# Patient Record
Sex: MALE | Race: WHITE | NOT HISPANIC OR LATINO | URBAN - METROPOLITAN AREA
[De-identification: names, ages, dates, MRNs, and addresses within clinical notes are randomized per-mention and may not be internally consistent; named-entity substitution may affect disease eponyms.]

---

## 2017-10-09 ENCOUNTER — EMERGENCY (EMERGENCY)
Facility: HOSPITAL | Age: 47
LOS: 1 days | Discharge: HOME | End: 2017-10-09
Attending: EMERGENCY MEDICINE
Payer: COMMERCIAL

## 2017-10-09 VITALS
DIASTOLIC BLOOD PRESSURE: 81 MMHG | TEMPERATURE: 98 F | HEART RATE: 64 BPM | RESPIRATION RATE: 18 BRPM | SYSTOLIC BLOOD PRESSURE: 135 MMHG | OXYGEN SATURATION: 96 %

## 2017-10-09 VITALS — HEIGHT: 69 IN | WEIGHT: 214.95 LBS

## 2017-10-09 DIAGNOSIS — S69.90XA UNSPECIFIED INJURY OF UNSPECIFIED WRIST, HAND AND FINGER(S), INITIAL ENCOUNTER: Chronic | ICD-10-CM

## 2017-10-09 DIAGNOSIS — Z98.89 OTHER SPECIFIED POSTPROCEDURAL STATES: Chronic | ICD-10-CM

## 2017-10-09 PROCEDURE — 96372 THER/PROPH/DIAG INJ SC/IM: CPT

## 2017-10-09 PROCEDURE — 72100 X-RAY EXAM L-S SPINE 2/3 VWS: CPT | Mod: 26

## 2017-10-09 PROCEDURE — 72100 X-RAY EXAM L-S SPINE 2/3 VWS: CPT

## 2017-10-09 PROCEDURE — 99284 EMERGENCY DEPT VISIT MOD MDM: CPT | Mod: 25

## 2017-10-09 PROCEDURE — 99283 EMERGENCY DEPT VISIT LOW MDM: CPT | Mod: 25

## 2017-10-09 RX ORDER — IBUPROFEN 200 MG
1 TABLET ORAL
Qty: 40 | Refills: 0 | OUTPATIENT
Start: 2017-10-09 | End: 2017-10-19

## 2017-10-09 RX ORDER — KETOROLAC TROMETHAMINE 30 MG/ML
30 SYRINGE (ML) INJECTION ONCE
Qty: 0 | Refills: 0 | Status: DISCONTINUED | OUTPATIENT
Start: 2017-10-09 | End: 2017-10-09

## 2017-10-09 RX ORDER — METHOCARBAMOL 500 MG/1
1500 TABLET, FILM COATED ORAL ONCE
Qty: 0 | Refills: 0 | Status: COMPLETED | OUTPATIENT
Start: 2017-10-09 | End: 2017-10-09

## 2017-10-09 RX ORDER — METOPROLOL TARTRATE 50 MG
0 TABLET ORAL
Qty: 0 | Refills: 0 | COMMUNITY

## 2017-10-09 RX ORDER — METHOCARBAMOL 500 MG/1
1 TABLET, FILM COATED ORAL
Qty: 15 | Refills: 0 | OUTPATIENT
Start: 2017-10-09 | End: 2017-10-14

## 2017-10-09 RX ADMIN — METHOCARBAMOL 1500 MILLIGRAM(S): 500 TABLET, FILM COATED ORAL at 07:52

## 2017-10-09 RX ADMIN — Medication 30 MILLIGRAM(S): at 07:52

## 2017-10-09 RX ADMIN — Medication 40 MILLIGRAM(S): at 07:52

## 2017-10-09 NOTE — ED PROVIDER NOTE - NEUROLOGICAL, MLM
Alert and oriented, no focal deficits, no motor or sensory deficits. 5/5 strength.  Normal patellar reflexes b/l

## 2017-10-09 NOTE — ED PROVIDER NOTE - OBJECTIVE STATEMENT
47M with h/o HTN, HLD pw gradual onset low back pain since Weds, radiating down both legs. Pt denies numbness/tingling/ weakness/ fevers/chills/nausea/vomiting/urinary frequency or incontinence.  Work involves heavy lifting as a , but he denies any sharp sudden pains; states that work on Tuesday was heavy.  Used lidocaine patch with minimal relief. Denies smoking or drug use.    PMD: Dr. Sandoval

## 2017-10-09 NOTE — ED ADULT NURSE NOTE - AGGRAVATING FACTORS
bending over/twisting/exertion/lying down/movement/positional change/sitting up/standing/lifting/walking

## 2017-10-09 NOTE — ED ADULT NURSE REASSESSMENT NOTE - NS ED NURSE REASSESS COMMENT FT1
Patient found laying in stretcher, awake, alert, and oriented times 3, breathing unlabored.  Patient complaining of bilateral lower back pain with intermittent radiation to lower legs which has been getting progressively worse since 10/4/17.  Awaiting medication for pain.  Patient aware of plan.  Will continue to monitor

## 2017-10-09 NOTE — ED PROVIDER NOTE - MEDICAL DECISION MAKING DETAILS
47M with h/o heavy labor pw gradual onset lower back pain since Weds; no red flags or symptoms c/w cord compression. Plan to treat symptomatically, refer to spine for outpt f/u. No indications

## 2017-10-09 NOTE — ED ADULT NURSE REASSESSMENT NOTE - NS ED NURSE REASSESS COMMENT FT1
Patient sleeping in stretcher in no apparent distress, breathing unlabored.  Awaiting plan of care.  Will continue to monitor

## 2017-10-09 NOTE — ED ADULT NURSE NOTE - OBJECTIVE STATEMENT
patient recevied awake alert and oriented states that wednesday he felt extreme pain to his lower back, states that he was in tight spaces at work and feels like he did something to his back - patient states that he cannot even put his shoes on or tie them due to increased back pain since wednesday. patient states that he has been taking naprosyn without relief - patient states that he some relief when laying on his right side. patient is able to lift lower extremeties slightly with increased pain. positive pedal pulses and flexes noted.

## 2019-05-25 ENCOUNTER — TRANSCRIPTION ENCOUNTER (OUTPATIENT)
Age: 49
End: 2019-05-25

## 2019-10-21 ENCOUNTER — EMERGENCY (EMERGENCY)
Facility: HOSPITAL | Age: 49
LOS: 1 days | Discharge: HOME | End: 2019-10-21
Attending: EMERGENCY MEDICINE
Payer: COMMERCIAL

## 2019-10-21 VITALS
RESPIRATION RATE: 18 BRPM | OXYGEN SATURATION: 98 % | DIASTOLIC BLOOD PRESSURE: 87 MMHG | HEART RATE: 98 BPM | SYSTOLIC BLOOD PRESSURE: 120 MMHG | TEMPERATURE: 98 F

## 2019-10-21 DIAGNOSIS — S69.90XA UNSPECIFIED INJURY OF UNSPECIFIED WRIST, HAND AND FINGER(S), INITIAL ENCOUNTER: Chronic | ICD-10-CM

## 2019-10-21 DIAGNOSIS — Z98.89 OTHER SPECIFIED POSTPROCEDURAL STATES: Chronic | ICD-10-CM

## 2019-10-21 PROCEDURE — 99283 EMERGENCY DEPT VISIT LOW MDM: CPT

## 2019-10-21 RX ORDER — TETANUS TOXOID, REDUCED DIPHTHERIA TOXOID AND ACELLULAR PERTUSSIS VACCINE, ADSORBED 5; 2.5; 8; 8; 2.5 [IU]/.5ML; [IU]/.5ML; UG/.5ML; UG/.5ML; UG/.5ML
0.5 SUSPENSION INTRAMUSCULAR ONCE
Refills: 0 | Status: DISCONTINUED | OUTPATIENT
Start: 2019-10-21 | End: 2019-10-28

## 2019-10-21 RX ORDER — OFLOXACIN 0.3 %
1 DROPS OPHTHALMIC (EYE)
Refills: 0 | Status: DISCONTINUED | OUTPATIENT
Start: 2019-10-21 | End: 2019-10-28

## 2019-10-21 RX ORDER — OFLOXACIN 0.3 %
1 DROPS OPHTHALMIC (EYE)
Qty: 1 | Refills: 0
Start: 2019-10-21 | End: 2019-10-25

## 2019-10-21 NOTE — ED PROVIDER NOTE - PATIENT PORTAL LINK FT
You can access the FollowMyHealth Patient Portal offered by [facility] by registering at the following website: [email or web address]. By joining [employer]’s FollowMyHealth portal, you will also be able to view your health information using other applications (apps) compatible with our system.

## 2019-10-21 NOTE — ED PROVIDER NOTE - PHYSICAL EXAMINATION
Left eye: + minimal conjunctival injection, PERRL, EOMI, no periorbital swelling or erythema, no visible foreign body, upper lid everted and no foreign body noted, + corneal abrasion noted at 5 oclock.  Visual acuity 20/30 bilaterally

## 2019-10-21 NOTE — ED PROVIDER NOTE - ATTENDING CONTRIBUTION TO CARE
AJM: Patient seen with PA and agree with above note. no fb noted on eye exam. symptoms improved with tetracaine. dc home with topical abx and close opththo follow up. pt comfortable with plan.

## 2019-10-21 NOTE — ED PROVIDER NOTE - OBJECTIVE STATEMENT
50 y/o male presents c/o foreign body sensation to left eye. PT reports he does work with a  however denies any known injury while last  using the . He states he was feeling well today and had a general check up with his doctor. He went home to get some sleep before work and woke up with left eye irritation. Denies any changes in vision. Last Tetanus > 10 years ago

## 2020-09-24 ENCOUNTER — TRANSCRIPTION ENCOUNTER (OUTPATIENT)
Age: 50
End: 2020-09-24

## 2022-04-18 ENCOUNTER — TRANSCRIPTION ENCOUNTER (OUTPATIENT)
Age: 52
End: 2022-04-18

## 2022-06-04 ENCOUNTER — EMERGENCY (EMERGENCY)
Facility: HOSPITAL | Age: 52
LOS: 1 days | Discharge: HOME | End: 2022-06-04
Attending: STUDENT IN AN ORGANIZED HEALTH CARE EDUCATION/TRAINING PROGRAM
Payer: COMMERCIAL

## 2022-06-04 VITALS
WEIGHT: 199.96 LBS | RESPIRATION RATE: 18 BRPM | SYSTOLIC BLOOD PRESSURE: 154 MMHG | HEIGHT: 69 IN | HEART RATE: 96 BPM | TEMPERATURE: 98 F | OXYGEN SATURATION: 98 % | DIASTOLIC BLOOD PRESSURE: 89 MMHG

## 2022-06-04 DIAGNOSIS — S69.90XA UNSPECIFIED INJURY OF UNSPECIFIED WRIST, HAND AND FINGER(S), INITIAL ENCOUNTER: Chronic | ICD-10-CM

## 2022-06-04 DIAGNOSIS — Z98.89 OTHER SPECIFIED POSTPROCEDURAL STATES: Chronic | ICD-10-CM

## 2022-06-04 PROCEDURE — 65220 REMOVE FOREIGN BODY FROM EYE: CPT

## 2022-06-04 PROCEDURE — 65220 REMOVE FOREIGN BODY FROM EYE: CPT | Mod: RT

## 2022-06-04 PROCEDURE — 99283 EMERGENCY DEPT VISIT LOW MDM: CPT | Mod: 25

## 2022-06-04 PROCEDURE — 99053 MED SERV 10PM-8AM 24 HR FAC: CPT

## 2022-06-04 PROCEDURE — 99282 EMERGENCY DEPT VISIT SF MDM: CPT | Mod: 25

## 2022-06-04 RX ORDER — FLUORESCEIN SODIUM 9 MG
1 STRIP OPHTHALMIC (EYE) ONCE
Refills: 0 | Status: COMPLETED | OUTPATIENT
Start: 2022-06-04 | End: 2022-06-04

## 2022-06-04 RX ORDER — OFLOXACIN 0.3 %
2 DROPS OPHTHALMIC (EYE) ONCE
Refills: 0 | Status: COMPLETED | OUTPATIENT
Start: 2022-06-04 | End: 2022-06-04

## 2022-06-04 RX ADMIN — Medication 1 APPLICATION(S): at 02:28

## 2022-06-04 RX ADMIN — Medication 2 DROP(S): at 02:20

## 2022-06-04 NOTE — ED PROVIDER NOTE - NSFOLLOWUPINSTRUCTIONS_ED_ALL_ED_FT
[FreeTextEntry1] : I had the pleasure of seeing Monisha Young in the office today for a follow up visit.\par \par Monisha is a diana 58 yo postmenopausal female. She is s/p bilateral mastectomy with right SLNB for [id number] positive cancer after receiving neoadjuvant chemotherapy 3/14/2018.\par \par She underwent right breast US on 10/20/2020 after palpable a right breast lump.  US demonstrated a 3.5 cm mass in the right breast 4:00 9 cmfn with recommendation of biopsy.  Biopsy was performed on 10/26/20 and demonstrated right breast IDC grade 3 ER 0% UT 0% Her2 positive.  CT C/A/P was preformed and demonstrated the 3.1 cm mass and borderline right axilla lymph node.  She was then sent for right axilla US and US came back with benign lymph node.  She went to the OR on 11/6/2020 of right breast reexcision of mastectomy.  Final pathology demonstrated 3.8 cm with involvement of dermal skin and retraction.  Lymphovascular invasion present.  ER 0% UT 0% Her2 positive.\par \par Genetics: color: Negative\par PMRT- 3/2021\par  \par 3/14/2018 [facility]\par Left breast, mastectomy\par -Fibrocystic changes\par Right sentinel lymph node, excision\par -Three negative lymph nodes 0/3\par Right sentinel lymph node 2, excision \par -One negative lymph node 0/1\par Right breast, mastectomy\par -No residual invasive carcinoma s/p neoadjuvant treatment\par No residual DCIS s/p neoadjuvant treatment\par Biopsy site changes\par Radial scar\par Proliferative fibrocystic changes\par Pathologic staging: ypT0, ypNo\par \par 10/20/2020  US breast complete RT\par Impression:  No suspicious mass at the area of the lump/  Ultrasound guided biopsy recommended.\par \par 10/26/2020  Pathology\par breast right 4 o'clcok, 9 cmfn, ultrasound guided core biopsy:  Invasive poorly differentiated ductal carcinoma with focal necrosis and foci with micropapillary features.  Huron score 8/9.  Invasive tumor measures at least 1.5 cm.  ER 0% UT 0 % Her2 negative.\par \par 11/2/2020  CT Chest/Abdomen/Pelvis\par IMPRESSION:Right breast mass corresponding to known carcinoma recurrence.\par Borderline right axillary lymph node.No metastatic lesion otherwise visualized.\par \par 11/2/2020  NM Bone IMG Whole Body\par Impression:  No radionuclide evidence of osseous metastases.\par \par 11/3/2020  US breast limited RT\par Impression:  Benign appearing right axillary lymph nodes.  Recommend surgical/oncologic management.  BIRADS 2 Benign findings.\par \par 11/6/2020  Surgical Pathology\par 1.  tissue (right breast mastectomy flap, medial magin):  Negative for carcinoma.\par 2.  Tissue (rightmastectomy flap):  Infiltrating ductal carcinoma, poorly differentiated with necrosis, involvement of dermal skin and retraction artifact, measuring 3.8 x 3.1 x 3.1 cm (see comment).  Chronic inflammation, foreign body giant cell reaction, organizing fat necrosis and fibrosis consitent with surgical changes.\par 3.  Tissue (right breast mastectomy flap, superior scar tissue):  Lymphovascular invasion present.  Patchy chronic inflammation, foreign body giant cell reaction, organizing fat necrosis and fibrosis consistent with surgical changes.\par 4.  Tissue (right breast mastectomy flap, medial margin):  Negative for carcinoma.\par 5.  Tissue ( right breast mastectomy flap, superior margin):  Negative for carcinoma.\par 6.  Tissue (right breast mastectomy flap, lateral margin):  Negative for carcinoma.\par 7.  Tissue (right breast mastectomy flap, inferior margin):  Negative for carcinoma.\par 8.  Tissue (right breast mastectomy flap, deep posterior margin):  Negative for carcinoma.\par 9.  Tissue ( right breast mastectomy, additional medial skin margin):  Negative for carcinoma\par \par 8/31/2021  CT chest abdomen pelvis\par IMPRESSION:  No gross metastatic disease.\par Interval development of what appears to be mild fibrotic changes in the right lung anteriorly which is presumably post radiation. If there has been no interval treatment, infection may be included in the differential diagnosis, although considered less likely.\par Mild splenomegaly which appears have mildly increased. Please correlate clinically.\par \par We reviewed clinical breast exam and she is well healed.  She has a erythematous rash which she states may be from [place] and is followed by Dr. Reddy ( deferred dermatology referral).  She does not want to undergo revision surgery with  until the Spring.  Recommendation for followup in 4 months.\par \par She understands and agrees with plan.  All questions answered.\par \par  1) Please follow-up with your primary care doctor in the next 5-7 days.  Please call tomorrow for an appointment.  If you cannot follow-up with your primary care doctor please return to the ED for any urgent issues.  2) You were given a copy of the tests performed today.  Please bring the results with you and review them with your primary care doctor.  3) If you have any worsening of symptoms or any other concerns please return to the ED immediately.  4) Please continue taking your home medications as directed.

## 2022-06-04 NOTE — ED PROVIDER NOTE - NSICDXFAMILYHX_GEN_ALL_CORE_FT
FAMILY HISTORY:  Father  Still living? Yes, Estimated age: 61-70  Family history of acute myocardial infarction, Age at diagnosis: 51-60  Family history of hypertension, Age at diagnosis: Age Unknown

## 2022-06-04 NOTE — ED PROVIDER NOTE - OBJECTIVE STATEMENT
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 Notes he thinks he may have gotten something in his right eye. Notes pain to the right eye. Foreign body sensation. Admits to photophobia and redness with pain. Admits he went to see his primary a few days prior and had poor decision at baseline. notes left eye was 20/50.

## 2022-06-04 NOTE — ED PROVIDER NOTE - CARE PROVIDER_API CALL
Syed Truong (MD; PhD)  Ophthalmology  [address]  [address]  [place] [address]  Phone: [phone]  Fax: [phone]  Follow Up Time:

## 2022-06-04 NOTE — ED PROVIDER NOTE - ATTENDING APP SHARED VISIT CONTRIBUTION OF CARE
This was a shared visit with MELISSA. I reviewed and verified the documentation and independently performed the documented history, exam, and MDM.    53yo man PMH HTN presents with right eye redness and pain with FB sensation while at work as a  despite having worn face mask. PT denies change in vision, pain with eye movement, or headache.    Pt appears uncomfortable on exam with tearing and erythema of right eye with PERRL, EOMI, acuity 20/30 OU. Neg siedel sign. Evidence of FB and corneal abrasion. FB removed by VI Tomlinson.     Discussed symptomatic management, medication use, return precautions, and instructions for close outpt f/u with ophthalmology.

## 2022-06-04 NOTE — ED PROVIDER NOTE - PATIENT PORTAL LINK FT
You can access the FollowMyHealth Patient Portal offered by [facility] by registering at the following website: [email or web address]. By joining [employer]’s FollowMyHealth portal, you will also be able to view your health information using other applications (apps) compatible with our system.

## 2023-01-14 ENCOUNTER — NON-APPOINTMENT (OUTPATIENT)
Age: 53
End: 2023-01-14

## 2023-09-11 ENCOUNTER — NON-APPOINTMENT (OUTPATIENT)
Age: 53
End: 2023-09-11

## 2023-09-16 NOTE — ED PROVIDER NOTE - CONSTITUTIONAL DEVELOPMENT, MLM
Patient is identified as having Diastolic (HFpEF) heart failure that is Acute on chronic. CHF is currently uncontrolled due to Rales/crackles on pulmonary exam and Pulmonary edema/pleural effusion on CXR. Latest ECHO performed and demonstrates- Results for orders placed during the hospital encounter of 03/29/23    Echo    Interpretation Summary  · The left ventricle is normal in size with moderate concentric hypertrophy and mildly decreased systolic function.  · The estimated ejection fraction is 45%.  · Grade I left ventricular diastolic dysfunction.  · Moderate right ventricular enlargement.  · Moderate right atrial enlargement.  · Mild-to-moderate aortic regurgitation.  · There is mild aortic valve stenosis.  · Aortic valve area is 1.44 cm2; peak velocity is 2.09 m/s; mean gradient is 10 mmHg.  · Mild-to-moderate mitral regurgitation.  · Moderate tricuspid regurgitation.  · Moderate pulmonic regurgitation.  · The estimated PA systolic pressure is 69 mmHg.  · There is moderate pulmonary hypertension.  · Moderate left atrial enlargement.  . Continue Beta Blocker, Furosemide, Aldactone and ARNI and monitor clinical status closely. Monitor on telemetry. Patient is on CHF pathway.  Monitor strict Is&Os and daily weights.  Place on fluid restriction of 1.5 L. Cardiology has been consulted. Continue to stress to patient importance of self efficacy and  on diet for CHF. Last BNP reviewed- and noted below   Recent Labs   Lab 09/15/23  1249   BNP 78     9/10/23  -Diuresing well  -Continue IV lasix      9/11/23- diuresis continued- BIPAP as needed for support   -9/12/23- diuresis continued- 4.8 L removed    -9/13/23- diuresis completed- 7.1 L removed   -9/14/23- IV Lasix given   -9/15/23- diuresis continued- 7.8L removed   -9/16/23- BNP and Troponin negative    well developed

## 2024-09-19 ENCOUNTER — NON-APPOINTMENT (OUTPATIENT)
Age: 54
End: 2024-09-19

## 2024-09-20 ENCOUNTER — EMERGENCY (EMERGENCY)
Facility: HOSPITAL | Age: 54
LOS: 1 days | Discharge: HOME | End: 2024-09-20
Attending: STUDENT IN AN ORGANIZED HEALTH CARE EDUCATION/TRAINING PROGRAM
Payer: COMMERCIAL

## 2024-09-20 VITALS
DIASTOLIC BLOOD PRESSURE: 83 MMHG | HEART RATE: 78 BPM | OXYGEN SATURATION: 96 % | RESPIRATION RATE: 18 BRPM | TEMPERATURE: 98 F | SYSTOLIC BLOOD PRESSURE: 140 MMHG

## 2024-09-20 VITALS
SYSTOLIC BLOOD PRESSURE: 149 MMHG | HEART RATE: 114 BPM | TEMPERATURE: 98 F | DIASTOLIC BLOOD PRESSURE: 81 MMHG | WEIGHT: 199.96 LBS | RESPIRATION RATE: 18 BRPM | OXYGEN SATURATION: 99 % | HEIGHT: 67 IN

## 2024-09-20 DIAGNOSIS — Z98.89 OTHER SPECIFIED POSTPROCEDURAL STATES: Chronic | ICD-10-CM

## 2024-09-20 DIAGNOSIS — S69.90XA UNSPECIFIED INJURY OF UNSPECIFIED WRIST, HAND AND FINGER(S), INITIAL ENCOUNTER: Chronic | ICD-10-CM

## 2024-09-20 PROCEDURE — 99284 EMERGENCY DEPT VISIT MOD MDM: CPT

## 2024-09-20 PROCEDURE — 96374 THER/PROPH/DIAG INJ IV PUSH: CPT

## 2024-09-20 PROCEDURE — 99284 EMERGENCY DEPT VISIT MOD MDM: CPT | Mod: 25

## 2024-09-20 RX ORDER — PREDNISONE 10 MG
1 TABLET, DOSE PACK ORAL
Qty: 5 | Refills: 0
Start: 2024-09-20 | End: 2024-09-24

## 2024-09-20 RX ORDER — EPINEPHRINE 0.3 MG/.3ML
0.3 INJECTION INTRAMUSCULAR; SUBCUTANEOUS
Qty: 1 | Refills: 0
Start: 2024-09-20

## 2024-09-20 RX ORDER — DIPHENHYDRAMINE HCL 50 MG
2 CAPSULE ORAL
Qty: 30 | Refills: 0
Start: 2024-09-20 | End: 2024-09-24

## 2024-09-20 RX ORDER — KETOROLAC TROMETHAMINE 30 MG/ML
15 INJECTION, SOLUTION INTRAMUSCULAR ONCE
Refills: 0 | Status: DISCONTINUED | OUTPATIENT
Start: 2024-09-20 | End: 2024-09-20

## 2024-09-20 RX ADMIN — KETOROLAC TROMETHAMINE 15 MILLIGRAM(S): 30 INJECTION, SOLUTION INTRAMUSCULAR at 20:47

## 2024-09-20 NOTE — ED ADULT TRIAGE NOTE - ADDITIONAL SAFETY/BANDS...
Problem: BIRTH - VAGINAL/ SECTION  Goal: Fetal and maternal status remain reassuring during the birth process  INTERVENTIONS:  - Monitor vital signs  - Monitor fetal heart rate  - Monitor uterine activity  - Monitor labor progression (vaginal deliv Additional Safety/Bands:

## 2024-09-20 NOTE — ED PROVIDER NOTE - OBJECTIVE STATEMENT
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  States while at dinner tonight he was stung by bee in his left fourth finger, afterwards developed hives, itching, swelling of the eyes.  Was given 50 mg of Benadryl at the restaurant, brought to urgent care, given steroids and epinephrine around 730.  Since then symptoms have been improving.  Denies any chest pain or shortness of breath.  No nausea or vomiting.  No reported tongue swelling.  Eye swelling has not been worsening since onset.

## 2024-09-20 NOTE — ED PROVIDER NOTE - NSFOLLOWUPINSTRUCTIONS_ED_ALL_ED_FT
General Allergic Reaction    WHAT YOU NEED TO KNOW:    An allergic reaction is your body's response to an allergen. Allergens include medicines, food, insect stings, animal dander, mold, latex, chemicals, and dust mites. Pollen from trees, grass, and weeds can also cause an allergic reaction. An allergic reaction can range from mild to severe.    DISCHARGE INSTRUCTIONS:    Call 911 for signs or symptoms of anaphylaxis, such as trouble breathing, swelling in your mouth or throat, or wheezing. You may also have itching, a rash, hives, or feel like you are going to faint.    Return to the emergency department if:  You have a skin rash, hives, swelling, or itching that is starting to get worse.  Your throat tightens, or your lips or tongue swell.  You have trouble swallowing or speaking.  You have worsening nausea, diarrhea, or abdominal cramps, or you are vomiting.  You have chest pain or tightness.    Contact your healthcare provider if:  You have questions or concerns about your condition or care.    Medicines: You may need any of the following:    Medicines may be given to relieve certain allergy symptoms such as itching, sneezing, and swelling. You may take them as a pill or use drops in your nose or eyes. Topical treatments may be given to put directly on your skin to help decrease itching or swelling.    Epinephrine may be prescribed if you are at risk for anaphylaxis. This is a severe allergic reaction that can be life-threatening. Your healthcare provider will tell you if you need to keep epinephrine with you. You will be taught when and how to use it.    Take your medicine as directed. Contact your healthcare provider if you think your medicine is not helping or if you have side effects. Tell your provider if you are allergic to any medicine. Keep a list of the medicines, vitamins, and herbs you take. Include the amounts, and when and why you take them. Bring the list or the pill bottles to follow-up visits. Carry your medicine list with you in case of an emergency.  Follow up with your doctor as directed: Write down your questions so you remember to ask them during your visits.    Manage your symptoms:    Avoid allergens. You may need to have allergy testing with your healthcare provider or a specialist to find your allergens.    Use cold compresses on your skin or eyes. This will help soothe skin or eyes affected by the allergic reaction. You can make a cold compress by soaking a washcloth in cool water. Wring out the extra water before you apply the washcloth.    Rinse your nasal passages with a saline solution. Daily rinsing may help clear allergens out of your nose. Use distilled water if possible. You can also boil tap water and then let it cool before you use it. Do not use tap water without boiling it first.    Do not smoke. Nicotine and other chemicals in cigarettes and cigars can make an allergic reaction worse, and can also cause lung damage. Ask your healthcare provider for information if you currently smoke and need help to quit. E-cigarettes or smokeless tobacco still contain nicotine. Talk to your healthcare provider before you use these products.

## 2024-09-20 NOTE — ED PROVIDER NOTE - PHYSICAL EXAMINATION
Constitutional: Awake, Alert, non-toxic. No acute distress.  HEAD: Normocephalic, atraumatic.   EYES: PERRL, EOM intact, conjunctiva and sclera are clear bilaterally.  ENT: External ears normal. No rhinorrhea, no tracheal deviation, no angioedema noted, no uvular edema, no tongue swelling  NECK: Supple, non-tender  CARDIOVASCULAR: regular rate and rhythm.  RESPIRATORY: Normal respiratory effort; breath sounds CTAB, no wheezes, rhonchi, or rales. Speaking in full sentences. No accessory muscle use.   ABDOMEN: Soft; non-tender, non-distended. No rebound or guarding.   MSK:  no lower extremity edema, no deformities, right 4th finger with small sting juan, no foreign body noted, finger swollen but compressible  SKIN: Warm, dry  NEURO: A&O x3. Sensory and motor functions are grossly intact. Speech is normal. No facial droop.  PSYCH: Appearance and judgement seem appropriate for gender and age.

## 2024-09-20 NOTE — ED PROVIDER NOTE - PROGRESS NOTE DETAILS
Patient observed after 4 hours, continues to feel better.  Vital signs improved here.  No longer requiring supplemental oxygen.  Will send medication to pharmacy and plan for discharge.  Plan to discharge patient. Return to ED precautions were discussed with the patient/family. All questions were answered. Marcial Spring MD.

## 2024-09-20 NOTE — ED PROVIDER NOTE - CLINICAL SUMMARY MEDICAL DECISION MAKING FREE TEXT BOX
Patient with concern for anaphylaxis from a bee sting, received Decadron, Benadryl, epinephrine prior to arrival around 730.  Patient states his symptoms are improving.  Will monitor here for any recurrent anaphylaxis at this time.  Patient taken off of nasal cannula oxygen by me, pulse ox 93 to 95% on room air.  Will monitor until 11:30 PM for any recurrent reaction.

## 2024-09-20 NOTE — ED ADULT TRIAGE NOTE - CHIEF COMPLAINT QUOTE
pt BIBA sent from  for anaphylaxis sp bee sting. Per pt epi and benadryl given approx 1930, no lip/tongue swelling in triage airway patent lungs cta. RR even unlabored NAD

## 2024-09-20 NOTE — ED ADULT NURSE NOTE - OBJECTIVE STATEMENT
BIBA sent from  for anaphylaxis sp bee sting, patient reports receiving epi and benadryl around 1930.  Patient placed on oxygen via nasal cannula 2l/min, tolerating well. Provider aware. Right hand was stung, hand appears swollen, capillary refill less than 3 seconds. Airway maintained.

## 2024-09-20 NOTE — ED PROVIDER NOTE - PATIENT PORTAL LINK FT
You can access the FollowMyHealth Patient Portal offered by [facility] by registering at the following website: [email or web address]. By joining [employer]’s FollowMyHealth portal, you will also be able to view your health information using other applications (apps) compatible with our system.

## 2025-07-15 NOTE — ED PROVIDER NOTE - TEMPLATE, MLM
General Partially impaired: cannot see medication labels or newsprint, but can see obstacles in path, and the surrounding layout; can count fingers at arm's length